# Patient Record
Sex: FEMALE | Race: WHITE
[De-identification: names, ages, dates, MRNs, and addresses within clinical notes are randomized per-mention and may not be internally consistent; named-entity substitution may affect disease eponyms.]

---
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
## 2020-07-22 ENCOUNTER — HOSPITAL ENCOUNTER (EMERGENCY)
Dept: HOSPITAL 76 - ED | Age: 20
Discharge: HOME | End: 2020-07-22
Payer: COMMERCIAL

## 2020-07-22 VITALS — SYSTOLIC BLOOD PRESSURE: 130 MMHG | DIASTOLIC BLOOD PRESSURE: 80 MMHG

## 2020-07-22 DIAGNOSIS — N30.91: Primary | ICD-10-CM

## 2020-07-22 LAB
CLARITY UR REFRACT.AUTO: CLEAR
GLUCOSE UR QL STRIP.AUTO: NEGATIVE MG/DL
HCG UR QL: NEGATIVE
KETONES UR QL STRIP.AUTO: NEGATIVE MG/DL
NITRITE UR QL STRIP.AUTO: NEGATIVE
PH UR STRIP.AUTO: 7 PH (ref 5–7.5)
PROT UR STRIP.AUTO-MCNC: 30 MG/DL
RBC # UR STRIP.AUTO: (no result) /UL
RBC # URNS HPF: (no result) /HPF (ref 0–5)
SP GR UR STRIP.AUTO: 1.02 (ref 1–1.03)
SP GR UR STRIP.AUTO: 1.02 (ref 1–1.03)
SQUAMOUS URNS QL MICRO: (no result)
UROBILINOGEN UR QL STRIP.AUTO: (no result) E.U./DL
UROBILINOGEN UR STRIP.AUTO-MCNC: NEGATIVE MG/DL

## 2020-07-22 PROCEDURE — 81003 URINALYSIS AUTO W/O SCOPE: CPT

## 2020-07-22 PROCEDURE — 87181 SC STD AGAR DILUTION PER AGT: CPT

## 2020-07-22 PROCEDURE — 87086 URINE CULTURE/COLONY COUNT: CPT

## 2020-07-22 PROCEDURE — 99284 EMERGENCY DEPT VISIT MOD MDM: CPT

## 2020-07-22 PROCEDURE — 81001 URINALYSIS AUTO W/SCOPE: CPT

## 2020-07-22 PROCEDURE — 99283 EMERGENCY DEPT VISIT LOW MDM: CPT

## 2020-07-22 PROCEDURE — 81025 URINE PREGNANCY TEST: CPT

## 2020-07-22 PROCEDURE — 87077 CULTURE AEROBIC IDENTIFY: CPT

## 2020-07-22 NOTE — ED PHYSICIAN DOCUMENTATION
History of Present Illness





- Stated complaint


Stated Complaint: FEMALE 





- Chief complaint


Chief Complaint: UTI





- History obtained from


History obtained from: Patient





- Additonal information


Additional information: 





19-year-old female presents to the emergency department with chief complaint of 

dysuria.  Patient reports that symptoms began about a week ago but have gotten 

progressively worse.  She endorses urinary urgency and frequency.  She denies 

fevers flank pain vomiting or chills.  She reports that this is similar to her 

previous urinary tract infection she has had in the past. Patient denies vaginal

discharge.  No history of sexually transmitted infection.





LMP 2 weeks ago.





Patient denies pertinent past medical history.  Takes no prescribed medications.

 Has not had any recent antibiotics within 90 days.  Reports no allergies.





Review of Systems


Constitutional: denies: Fever, Chills


Cardiac: denies: Chest pain / pressure, Palpitations


Respiratory: denies: Dyspnea, Cough


GI: denies: Abdominal Pain, Nausea, Vomiting


: reports: Dysuria, Frequency, Hesitancy, LMP (07/08/20).  denies: Hematuria, 

Discharge





PD PAST MEDICAL HISTORY





- Past Surgical History


Past Surgical History: No





- Present Medications


Home Medications: 


                                Ambulatory Orders











 Medication  Instructions  Recorded  Confirmed


 


Cephalexin [Keflex] 500 mg PO Q12H #14 capsule 07/22/20 














- Allergies


Allergies/Adverse Reactions: 


                                    Allergies











Allergy/AdvReac Type Severity Reaction Status Date / Time


 


No Known Drug Allergies Allergy   Verified 07/22/20 19:01














- Social History


Does the pt smoke?: No


Smoking Status: Never smoker


Does the pt drink ETOH?: No


Does the pt have substance abuse?: No





- Immunizations


Immunizations are current?: Yes





- POLST


Patient has POLST: No





PD ED PE NORMAL





- General


General: Alert and oriented X 3, No acute distress





- Cardiac


Cardiac: RRR, No murmur





- Respiratory


Respiratory: No respiratory distress





- Abdomen


Abdomen: Normal bowel sounds, Soft, Other (Mild suprapubic tenderness.  No flank

 or CVA tenderness.  Negative Jones's.  Negative McBurney's)





- Female 


Female : Deferred





- Derm


Derm: Normal color, Warm and dry





- Neuro


Neuro: Alert and oriented X 3, CNs 2-12 intact, No motor deficit





Results





- Vitals


Vitals: 


                               Vital Signs - 24 hr











  07/22/20





  19:01


 


Temperature 36.8 C


 


Heart Rate 76


 


Respiratory 16





Rate 


 


Blood Pressure 136/84 H


 


O2 Saturation 100








                                     Oxygen











O2 Source                      Room air

















- Labs


Labs: 


                                Laboratory Tests











  07/22/20 07/22/20





  19:07 19:07


 


Urine Color  YELLOW 


 


Urine Clarity  CLEAR 


 


Urine pH  7.0 


 


Ur Specific Gravity  1.025  1.025


 


Urine Protein  30 H 


 


Urine Glucose (UA)  NEGATIVE 


 


Urine Ketones  NEGATIVE 


 


Urine Occult Blood  LARGE H 


 


Urine Nitrite  NEGATIVE 


 


Urine Bilirubin  NEGATIVE 


 


Urine Urobilinogen  0.2 (NORMAL) 


 


Ur Leukocyte Esterase  SMALL H 


 


Urine RBC  TNTC H 


 


Urine WBC  4-5 


 


Ur Squamous Epith Cells  NONE SEEN 


 


Urine Bacteria  Rare 


 


Ur Microscopic Review  INDICATED 


 


Urine Culture Comments  INDICATED 


 


Urine HCG, Qual   NEGATIVE














PD MEDICAL DECISION MAKING





- ED course


Complexity details: reviewed results, d/w patient


ED course: 





19-year-old female presents to the emergency department with a chief complaint 

of dysuria that has gotten progressively worse over the last week.


- UA c/w infection given symptoms (+ blood, mild LE, + wbc)  Pt is non pregnant.

  Will treat with keflex.  first dose administered in the ED


- no fevers, flank pain, vomiting, lower suspicion for ascending infection.  

Emergent return precautions discussed








Departure





- Departure


Clinical Impression: 


 Cystitis





Condition: Stable


Instructions:  ED UTI Cystitis Female


Prescriptions: 


Cephalexin [Keflex] 500 mg PO Q12H #14 capsule


Comments: 


Your urine looks like you have an infection.  We have given you first dose of 

antibiotic here in the emergency department.  Tomorrow please fill the 

prescription for the oral antibiotics.  Return to the emergency department if 

your pain or symptoms do not begin to improve over the next 48 hours or you 

develop fevers flank pain or uncontrolled vomiting.

## 2021-10-09 ENCOUNTER — HOSPITAL ENCOUNTER (EMERGENCY)
Dept: HOSPITAL 76 - ED | Age: 21
Discharge: HOME | End: 2021-10-09
Payer: COMMERCIAL

## 2021-10-09 VITALS — SYSTOLIC BLOOD PRESSURE: 114 MMHG | DIASTOLIC BLOOD PRESSURE: 74 MMHG

## 2021-10-09 DIAGNOSIS — Z23: ICD-10-CM
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
## 2021-10-09 DIAGNOSIS — S61.011A: Primary | ICD-10-CM

## 2021-10-09 PROCEDURE — 90715 TDAP VACCINE 7 YRS/> IM: CPT

## 2021-10-09 PROCEDURE — 90471 IMMUNIZATION ADMIN: CPT

## 2021-10-09 PROCEDURE — 12001 RPR S/N/AX/GEN/TRNK 2.5CM/<: CPT

## 2021-10-09 PROCEDURE — 99281 EMR DPT VST MAYX REQ PHY/QHP: CPT

## 2021-10-09 PROCEDURE — 99283 EMERGENCY DEPT VISIT LOW MDM: CPT
